# Patient Record
Sex: FEMALE | Race: WHITE
[De-identification: names, ages, dates, MRNs, and addresses within clinical notes are randomized per-mention and may not be internally consistent; named-entity substitution may affect disease eponyms.]

---

## 2017-03-13 ENCOUNTER — HOSPITAL ENCOUNTER (OUTPATIENT)
Dept: HOSPITAL 45 - C.MAMM | Age: 62
Discharge: HOME | End: 2017-03-13
Attending: OBSTETRICS & GYNECOLOGY
Payer: COMMERCIAL

## 2017-03-13 DIAGNOSIS — Z12.31: Primary | ICD-10-CM

## 2017-03-14 NOTE — MAMMOGRAPHY REPORT
BILATERAL DIGITAL SCREENING MAMMOGRAM TOMOSYNTHESIS WITH CAD: 3/13/2017

CLINICAL HISTORY: Routine screening.  Patient has no complaints.  





TECHNIQUE:  Breast tomosynthesis in addition to standard 2D mammography was performed. Current study
 was also evaluated with a Computer Aided Detection (CAD) system.  



COMPARISON: Comparison is made to exams dated:  3/7/2016 mammogram, 2/27/2014 mammogram, 3/4/2015 ma
mmogram, 2/26/2013 mammogram, and 2/14/2012 mammogram - [facility].   



BREAST COMPOSITION:  The tissue of both breasts is heterogeneously dense, which may obscure small ma
sses.  



FINDINGS: There is fluctuating nodularity throughout the left breast, and scattered benign-appearing
 calcifications in the breasts.  No new suspicious mass, architectural distortion or cluster of micr
ocalcifications is seen.  



IMPRESSION:  ACR BI-RADS CATEGORY 1: NEGATIVE

There is no mammographic evidence of malignancy. A 1 year screening mammogram is recommended.  The p
atient will receive written notification of the results.  





Approximately 10% of breast cancers are not detected with mammography. A negative mammographic repor
t should not delay biopsy if a clinically suggestive mass is present.



Stephanie Palafox M.D.          

ay/:3/13/2017 18:30:14  



Imaging Technologist: Aaron PERALTA(R)(M), [facility]

letter sent: Normal 1/2  

BI-RADS Code: ACR BI-RADS Category 1: Negative

## 2017-05-17 ENCOUNTER — HOSPITAL ENCOUNTER (OUTPATIENT)
Dept: HOSPITAL 45 - C.ULTR | Age: 62
Discharge: HOME | End: 2017-05-17
Attending: INTERNAL MEDICINE
Payer: COMMERCIAL

## 2017-05-17 DIAGNOSIS — M79.602: Primary | ICD-10-CM

## 2017-05-17 NOTE — DIAGNOSTIC IMAGING REPORT
LEFT UPPER EXTREMITY ULTRASOUND



CLINICAL HISTORY: M79.602 Arm pain, lateral, left upper lateral [id number]  





COMPARISON STUDY:  None.



FINDINGS: Real-time sonographic imaging of the left upper lateral arm was

performed. Multiple fat lobules are present. No fluid collections identified.

Dominant fat lobule measures 2.4 x 1.4 x 0.8 cm. This could represent a lipoma.



IMPRESSION:  A 2.4 x 1.4 x 0.8 cm dominant fat lobule which may represent a

lipoma. No fluid collections identified within the left upper arm. 









Electronically signed by:  Pipe Jefferson M.D.

5/17/2017 11:56 AM



Dictated Date/Time:  5/17/2017 11:55 AM

## 2017-09-15 ENCOUNTER — HOSPITAL ENCOUNTER (OUTPATIENT)
Dept: HOSPITAL 45 - C.PAPS | Age: 62
Discharge: HOME | End: 2017-09-15
Attending: OBSTETRICS & GYNECOLOGY
Payer: COMMERCIAL

## 2017-09-15 DIAGNOSIS — Z01.419: Primary | ICD-10-CM

## 2018-03-14 ENCOUNTER — HOSPITAL ENCOUNTER (OUTPATIENT)
Dept: HOSPITAL 45 - C.MAMM | Age: 63
Discharge: HOME | End: 2018-03-14
Attending: OBSTETRICS & GYNECOLOGY
Payer: COMMERCIAL

## 2018-03-14 DIAGNOSIS — Z12.31: Primary | ICD-10-CM

## 2018-03-15 NOTE — MAMMOGRAPHY REPORT
BILATERAL DIGITAL SCREENING MAMMOGRAM TOMOSYNTHESIS WITH CAD: 3/14/2018

CLINICAL HISTORY: Routine screening.  





TECHNIQUE:  Breast tomosynthesis in addition to standard 2D mammography was performed. Current study 
was also evaluated with a Computer Aided Detection (CAD) system.  



COMPARISON: Comparison is made to exams dated:  3/13/2017 mammogram, 3/7/2016 mammogram, 3/4/2015 amriela
mogram, 2/27/2014 mammogram, 2/26/2013 mammogram, and 2/14/2012 mammogram - [facility].   



BREAST COMPOSITION:  The tissue of both breasts is heterogeneously dense, which may obscure small mas
ses.  



FINDINGS:  No suspicious masses, calcifications, or areas of architectural distortion are noted in ei
ther breast. There has been no significant interval change compared to prior exams.  Bilateral asymme
tries and scattered benign-appearing left breast calcifications are not significantly changed.





IMPRESSION:  ACR BI-RADS CATEGORY 2: BENIGN

There is no mammographic evidence of malignancy. A 1 year screening mammogram is recommended.  The pa
tient will receive written notification of the results.  





Approximately 10% of breast cancers are not detected with mammography. A negative mammographic report
 should not delay biopsy if a clinically suggestive mass is present.



Michelle Narayanan M.D.          

/:3/14/2018 15:25:52  



Imaging Technologist: Aaron BURNETTE)(M), [facility]

letter sent: Normal 1/2  

BI-RADS Code: ACR BI-RADS Category 2: Benign